# Patient Record
Sex: MALE | NOT HISPANIC OR LATINO
[De-identification: names, ages, dates, MRNs, and addresses within clinical notes are randomized per-mention and may not be internally consistent; named-entity substitution may affect disease eponyms.]

---

## 2024-02-20 PROBLEM — Z00.00 ENCOUNTER FOR PREVENTIVE HEALTH EXAMINATION: Status: ACTIVE | Noted: 2024-02-20

## 2024-02-22 ENCOUNTER — NON-APPOINTMENT (OUTPATIENT)
Age: 29
End: 2024-02-22

## 2024-02-23 ENCOUNTER — APPOINTMENT (OUTPATIENT)
Dept: UROLOGY | Facility: CLINIC | Age: 29
End: 2024-02-23
Payer: COMMERCIAL

## 2024-02-23 ENCOUNTER — NON-APPOINTMENT (OUTPATIENT)
Age: 29
End: 2024-02-23

## 2024-02-23 VITALS
TEMPERATURE: 98.2 F | DIASTOLIC BLOOD PRESSURE: 60 MMHG | WEIGHT: 205 LBS | SYSTOLIC BLOOD PRESSURE: 112 MMHG | HEART RATE: 55 BPM | BODY MASS INDEX: 28.7 KG/M2 | HEIGHT: 71 IN

## 2024-02-23 NOTE — END OF VISIT
[FreeTextEntry3] : I, Dr. Mendoza, personally performed the evaluation and management (E/M) services for this new patient.  That E/M includes conducting the clinically appropriate initial history &/or exam, assessing all conditions, and establishing the plan of care.  Today, my BHARATHI, Hugo Martinez, was here to observe my evaluation and management service for this patient & follow plan of care established by me going forward.

## 2024-02-23 NOTE — PHYSICAL EXAM
[Normal Appearance] : normal appearance [General Appearance - In No Acute Distress] : no acute distress [Edema] : no peripheral edema [] : no respiratory distress [Abdomen Soft] : soft [Exaggerated Use Of Accessory Muscles For Inspiration] : no accessory muscle use [Abdomen Tenderness] : non-tender [Abdomen Hernia] : no hernia was discovered [Penis Abnormality] : normal circumcised penis [Urethral Meatus] : meatus normal [Urinary Bladder Findings] : the bladder was normal on palpation [Scrotum] : the scrotum was normal [Testes Tenderness] : no tenderness of the testes [Normal Station and Gait] : the gait and station were normal for the patient's age [Testes Mass (___cm)] : there were no testicular masses [Oriented To Time, Place, And Person] : oriented to person, place, and time [Affect] : the affect was normal [de-identified] : full R epididymis, b/l palpable vas, testis b/l ~25cc smooth no nodules nontender. grade 1 R sided varicocele

## 2024-02-23 NOTE — HISTORY OF PRESENT ILLNESS
[FreeTextEntry1] : HAFSA ZAPATA is a 28 year M with PMHx of knee surgery 2019 presenting for hypogonadism on 02/23/2024  Fertility with Dr. Jeannie Anton, [facility]   He reports  - trying to conceive x 2 years - SA ~ 1 year ago, told  - IUI x 2, 11 M sperm, 13M sperm respectively on IUI SA  - SA 2% morpho, as per patient wife, reports not available  - chemical pregnancy natural conception, no fertilization - neg female factor  - taking 3 months off from fertility treatment at this time.   He denies fever chills urinary complaints, ED, flank pain   Social history; Denies   Family history: Denies   Allergies: NKDA

## 2024-02-23 NOTE — LETTER BODY
[Dear  ___] : Dear  [unfilled], [FreeTextEntry2] : Jeannie Anton MD [facility] [address], [address] [place] [address]   [FreeTextEntry1] : I had the pleasure of seeing HAFSA JUDD, a 28 year old man, in the office in consultation today. Please see the attached note for full details.  Thank you very much for allowing me to participate in the care of this patient. If you have any questions please feel free to call me at any time.    Sincerely yours,    Richie Mendoza MD, CHRISS Director, Male Fertility and Microsurgery  of [facility]

## 2024-02-23 NOTE — ASSESSMENT
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

## 2024-02-25 LAB
ESTRADIOL SERPL-MCNC: 20 PG/ML
FSH SERPL-MCNC: 3.3 IU/L
LH SERPL-ACNC: 6.6 IU/L

## 2024-02-28 ENCOUNTER — NON-APPOINTMENT (OUTPATIENT)
Age: 29
End: 2024-02-28

## 2024-02-28 DIAGNOSIS — E29.1 TESTICULAR HYPOFUNCTION: ICD-10-CM

## 2024-02-28 LAB
TESTOST FREE SERPL-MCNC: 7.1 PG/ML
TESTOST SERPL-MCNC: 299 NG/DL

## 2024-02-29 ENCOUNTER — NON-APPOINTMENT (OUTPATIENT)
Age: 29
End: 2024-02-29

## 2024-03-01 RX ORDER — CLOMIPHENE CITRATE 50 MG/1
50 TABLET ORAL
Qty: 15 | Refills: 3 | Status: ACTIVE | COMMUNITY
Start: 2024-02-28 | End: 1900-01-01

## 2024-03-02 ENCOUNTER — TRANSCRIPTION ENCOUNTER (OUTPATIENT)
Age: 29
End: 2024-03-02

## 2024-03-05 ENCOUNTER — TRANSCRIPTION ENCOUNTER (OUTPATIENT)
Age: 29
End: 2024-03-05

## 2024-03-08 ENCOUNTER — TRANSCRIPTION ENCOUNTER (OUTPATIENT)
Age: 29
End: 2024-03-08

## 2024-03-15 ENCOUNTER — TRANSCRIPTION ENCOUNTER (OUTPATIENT)
Age: 29
End: 2024-03-15

## 2024-04-02 ENCOUNTER — LABORATORY RESULT (OUTPATIENT)
Age: 29
End: 2024-04-02

## 2024-04-03 ENCOUNTER — TRANSCRIPTION ENCOUNTER (OUTPATIENT)
Age: 29
End: 2024-04-03

## 2024-04-03 ENCOUNTER — NON-APPOINTMENT (OUTPATIENT)
Age: 29
End: 2024-04-03

## 2024-04-18 ENCOUNTER — TRANSCRIPTION ENCOUNTER (OUTPATIENT)
Age: 29
End: 2024-04-18